# Patient Record
Sex: MALE | Race: WHITE
[De-identification: names, ages, dates, MRNs, and addresses within clinical notes are randomized per-mention and may not be internally consistent; named-entity substitution may affect disease eponyms.]

---

## 2018-01-17 ENCOUNTER — HOSPITAL ENCOUNTER (OUTPATIENT)
Dept: HOSPITAL 47 - OR | Age: 38
Discharge: HOME | End: 2018-01-17
Attending: SURGERY
Payer: COMMERCIAL

## 2018-01-17 VITALS — HEART RATE: 60 BPM | DIASTOLIC BLOOD PRESSURE: 76 MMHG | RESPIRATION RATE: 16 BRPM | SYSTOLIC BLOOD PRESSURE: 127 MMHG

## 2018-01-17 VITALS — BODY MASS INDEX: 39.3 KG/M2

## 2018-01-17 VITALS — TEMPERATURE: 97 F

## 2018-01-17 DIAGNOSIS — Z79.899: ICD-10-CM

## 2018-01-17 DIAGNOSIS — M10.9: ICD-10-CM

## 2018-01-17 DIAGNOSIS — K42.0: Primary | ICD-10-CM

## 2018-01-17 LAB — GLUCOSE BLD-MCNC: 122 MG/DL (ref 75–99)

## 2018-01-17 PROCEDURE — 49653: CPT

## 2018-01-17 RX ADMIN — HYDROMORPHONE HYDROCHLORIDE PRN MG: 1 INJECTION, SOLUTION INTRAMUSCULAR; INTRAVENOUS; SUBCUTANEOUS at 12:47

## 2018-01-17 RX ADMIN — HYDROMORPHONE HYDROCHLORIDE PRN MG: 1 INJECTION, SOLUTION INTRAMUSCULAR; INTRAVENOUS; SUBCUTANEOUS at 12:42

## 2018-01-17 RX ADMIN — HYDROMORPHONE HYDROCHLORIDE PRN MG: 1 INJECTION, SOLUTION INTRAMUSCULAR; INTRAVENOUS; SUBCUTANEOUS at 12:34

## 2018-01-17 RX ADMIN — HYDROMORPHONE HYDROCHLORIDE PRN MG: 1 INJECTION, SOLUTION INTRAMUSCULAR; INTRAVENOUS; SUBCUTANEOUS at 12:26

## 2018-01-17 NOTE — P.OP
Date of Procedure: 01/17/18


Preoperative Diagnosis: 


Incarcerated umbilical hernia


Postoperative Diagnosis: 


Incarcerated umbilical hernia


Procedure(s) Performed: 


Laparoscopic repair of incarcerated umbilical hernia


Anesthesia: CELIA


Surgeon: Dashawn Llamas


Estimated Blood Loss (ml): 5


Pathology: none sent


Condition: stable


Disposition: PACU


Description of Procedure: 


The patient's placed on the operating table in supine position.  He received 

general anesthesia.  His abdomen was prepped and draped usual sterile fashion.  

The abdomen was entered in the left upper quadrant with a 5 mm blade less 

optical trocar under direct visualization.  The abdomen then was insufflated.  

After adequate insufflation the laparoscope was placed.  Cavity.  Next a 8 mm 

robotic trochars placed left lower quadrant and a 12 mm trochars placed in left 

lateral position.  The original 5 mm trocar was exchanged for an 8 mm robotic 

trocar.  The patient's placed in the left side up position.  The patient was 

undocked the robot.  The umbilical hernia was visualized.  There was 

incarcerated fat within the umbilical hernia.  He is a hook cautery 

incarcerated fat and hernia sac were dissected free.  The fascial defect was 

then closed with OV lock suture.  The fascial defect was then buttressed with 

ventral light ST mesh.  This secured with 2 OV lock suture.  The patient was 

then undocked the robot.  The needles were retrieved.  .  The 12 mm trocar site 

was closed with 0 Ethibond suture.  The trochars withdrawn.  The skin was 

closed interrupted 3-0 Monocryl suture.  Dermabond was applied.  Patient was 

sent to recovery in stable condition.





Additional CC's:

## 2018-01-17 NOTE — P.GSHP
History of Present Illness


H&P Date: 01/17/18


Chief Complaint: Umbilical hernia





This is a 37-year-old male from Dr. Alfredo Marte.  Patient presents today for 

laparoscopic robotic repair of umbilical hernia.  He's been having pain in his 

umbilicus.  He seen [facility] found have a small reducible umbilical hernia.





Past Medical History


Additional Past Medical History / Comment(s): gout.  umbilical hernia


History of Any Multi-Drug Resistant Organisms: None Reported


Past Surgical History: Adenoidectomy, Orthopedic Surgery, Tonsillectomy


Additional Past Surgical History / Comment(s): lt knee scope


Past Anesthesia/Blood Transfusion Reactions: No Reported Reaction


Smoking Status: Never smoker





- Past Family History


  ** Father


Family Medical History: Cancer





Medications and Allergies


 Home Medications











 Medication  Instructions  Recorded  Confirmed  Type


 


Allopurinol [Zyloprim] 300 mg PO HS 01/09/18 01/17/18 History











 Allergies











Allergy/AdvReac Type Severity Reaction Status Date / Time


 


No Known Allergies Allergy   Verified 01/17/18 08:52














Surgical - Exam


 Vital Signs











Temp Pulse Resp BP Pulse Ox


 


 98.3 F   67   16   145/83   98 


 


 01/17/18 08:54  01/17/18 08:54  01/17/18 08:54  01/17/18 08:54  01/17/18 08:54














- General


well developed, no distress





- Eyes


PERRL





- ENT


normal pinna





- Neck


no masses





- Respiratory


normal expansion





- Cardiovascular


Rhythm: regular





- Abdomen


Abdomen: soft, non tender


Hernia: umbilical (Reducible umbilical hernia)





Results





- Labs


 Abnormal Lab Results - Last 24 Hours (Table)











  01/17/18 Range/Units





  09:11 


 


POC Glucose (mg/dL)  122 H  (75-99)  mg/dL














Assessment and Plan


Assessment: 





Umbilical hernia.  We'll perform laparoscopic robotic system repair.